# Patient Record
Sex: FEMALE | Race: WHITE | NOT HISPANIC OR LATINO | Employment: OTHER | URBAN - METROPOLITAN AREA
[De-identification: names, ages, dates, MRNs, and addresses within clinical notes are randomized per-mention and may not be internally consistent; named-entity substitution may affect disease eponyms.]

---

## 2021-05-12 ENCOUNTER — PATIENT MESSAGE (OUTPATIENT)
Dept: RESEARCH | Facility: HOSPITAL | Age: 51
End: 2021-05-12

## 2021-07-11 PROBLEM — I35.0 NONRHEUMATIC AORTIC VALVE STENOSIS: Status: ACTIVE | Noted: 2021-07-11

## 2021-08-11 PROBLEM — I35.0 NONRHEUMATIC AORTIC VALVE STENOSIS: Status: RESOLVED | Noted: 2021-07-11 | Resolved: 2021-08-11

## 2021-08-11 PROBLEM — D72.829 LEUCOCYTOSIS: Status: ACTIVE | Noted: 2021-08-11

## 2021-08-11 PROBLEM — Z95.2 S/P AVR (AORTIC VALVE REPLACEMENT): Status: ACTIVE | Noted: 2021-08-11

## 2021-08-12 PROBLEM — D69.6 THROMBOCYTOPENIA: Status: ACTIVE | Noted: 2021-08-12

## 2021-08-12 PROBLEM — D62 ACUTE BLOOD LOSS ANEMIA: Status: ACTIVE | Noted: 2021-08-12

## 2021-08-13 PROBLEM — E83.39 HYPOPHOSPHATEMIA: Status: ACTIVE | Noted: 2021-08-13

## 2021-10-22 PROBLEM — Z95.2 S/P AVR (AORTIC VALVE REPLACEMENT): Status: ACTIVE | Noted: 2021-10-22

## 2022-01-09 PROBLEM — E11.65 UNCONTROLLED TYPE 2 DIABETES MELLITUS WITH HYPERGLYCEMIA: Chronic | Status: ACTIVE | Noted: 2022-01-09

## 2022-01-09 PROBLEM — E11.65 UNCONTROLLED TYPE 2 DIABETES MELLITUS WITH HYPERGLYCEMIA: Status: ACTIVE | Noted: 2022-01-09

## 2022-02-08 PROBLEM — I10 PRIMARY HYPERTENSION: Status: RESOLVED | Noted: 2022-02-08 | Resolved: 2022-02-08

## 2022-02-08 PROBLEM — I10 PRIMARY HYPERTENSION: Status: ACTIVE | Noted: 2022-02-08

## 2022-02-10 PROBLEM — G56.92 NEUROPATHY OF LEFT UPPER EXTREMITY: Chronic | Status: ACTIVE | Noted: 2022-02-10

## 2022-02-10 PROBLEM — G56.92 NEUROPATHY OF LEFT UPPER EXTREMITY: Status: ACTIVE | Noted: 2022-02-10

## 2022-03-11 ENCOUNTER — PATIENT MESSAGE (OUTPATIENT)
Dept: ADMINISTRATIVE | Facility: OTHER | Age: 52
End: 2022-03-11

## 2022-03-22 ENCOUNTER — PATIENT MESSAGE (OUTPATIENT)
Dept: ADMINISTRATIVE | Facility: OTHER | Age: 52
End: 2022-03-22

## 2022-04-21 PROBLEM — I95.9 HYPOTENSION: Status: ACTIVE | Noted: 2022-04-21

## 2022-05-04 ENCOUNTER — PATIENT MESSAGE (OUTPATIENT)
Dept: OTHER | Facility: OTHER | Age: 52
End: 2022-05-04

## 2022-05-28 PROBLEM — I33.0 INFECTIVE ENDOCARDITIS: Status: ACTIVE | Noted: 2022-05-28

## 2022-05-30 PROBLEM — R51.9 HEADACHE: Status: ACTIVE | Noted: 2022-05-30

## 2022-05-30 PROBLEM — K21.9 GERD (GASTROESOPHAGEAL REFLUX DISEASE): Status: ACTIVE | Noted: 2022-05-30

## 2022-06-09 ENCOUNTER — PATIENT MESSAGE (OUTPATIENT)
Dept: OTHER | Facility: OTHER | Age: 52
End: 2022-06-09

## 2022-08-07 ENCOUNTER — PATIENT MESSAGE (OUTPATIENT)
Dept: ADMINISTRATIVE | Facility: OTHER | Age: 52
End: 2022-08-07

## 2022-09-27 ENCOUNTER — PATIENT MESSAGE (OUTPATIENT)
Dept: OTHER | Facility: OTHER | Age: 52
End: 2022-09-27

## 2022-09-30 ENCOUNTER — ANTI-COAG VISIT (OUTPATIENT)
Dept: CARDIOLOGY | Facility: CLINIC | Age: 52
End: 2022-09-30

## 2022-09-30 DIAGNOSIS — Z95.2 S/P AVR (AORTIC VALVE REPLACEMENT): Primary | ICD-10-CM

## 2022-10-03 PROBLEM — I95.9 HYPOTENSION: Status: RESOLVED | Noted: 2022-04-21 | Resolved: 2022-10-03

## 2022-10-03 PROBLEM — E83.39 HYPOPHOSPHATEMIA: Status: RESOLVED | Noted: 2021-08-13 | Resolved: 2022-10-03

## 2022-10-03 PROBLEM — R51.9 HEADACHE: Status: RESOLVED | Noted: 2022-05-30 | Resolved: 2022-10-03

## 2022-10-18 NOTE — PROGRESS NOTES
Encounter created in error and chart signed to close for administrative purposes only.  The listed referring Physician unwilling to sign chart so requested Dr Dejesus to sign.

## 2022-11-04 PROBLEM — H91.93 DECREASED HEARING OF BOTH EARS: Status: ACTIVE | Noted: 2022-11-04

## 2022-11-07 ENCOUNTER — PATIENT MESSAGE (OUTPATIENT)
Dept: ADMINISTRATIVE | Facility: OTHER | Age: 52
End: 2022-11-07

## 2022-12-04 ENCOUNTER — PATIENT MESSAGE (OUTPATIENT)
Dept: OTHER | Facility: OTHER | Age: 52
End: 2022-12-04

## 2022-12-05 ENCOUNTER — PATIENT MESSAGE (OUTPATIENT)
Dept: OTHER | Facility: OTHER | Age: 52
End: 2022-12-05

## 2023-02-17 ENCOUNTER — PATIENT MESSAGE (OUTPATIENT)
Dept: ADMINISTRATIVE | Facility: OTHER | Age: 53
End: 2023-02-17

## 2023-05-10 ENCOUNTER — PATIENT OUTREACH (OUTPATIENT)
Dept: ADMINISTRATIVE | Facility: HOSPITAL | Age: 53
End: 2023-05-10

## 2023-06-12 ENCOUNTER — PATIENT MESSAGE (OUTPATIENT)
Dept: ADMINISTRATIVE | Facility: OTHER | Age: 53
End: 2023-06-12

## 2023-08-04 PROBLEM — Z79.01 ANTICOAGULATION GOAL OF INR 2.5 TO 3.5: Status: ACTIVE | Noted: 2023-08-04

## 2023-08-04 PROBLEM — Z51.81 ANTICOAGULATION GOAL OF INR 2.5 TO 3.5: Status: ACTIVE | Noted: 2023-08-04

## 2023-08-16 ENCOUNTER — PATIENT MESSAGE (OUTPATIENT)
Dept: ADMINISTRATIVE | Facility: OTHER | Age: 53
End: 2023-08-16

## 2023-11-14 ENCOUNTER — PATIENT MESSAGE (OUTPATIENT)
Dept: ADMINISTRATIVE | Facility: HOSPITAL | Age: 53
End: 2023-11-14

## 2024-02-20 ENCOUNTER — PATIENT MESSAGE (OUTPATIENT)
Dept: ADMINISTRATIVE | Facility: HOSPITAL | Age: 54
End: 2024-02-20

## 2024-03-07 PROBLEM — T82.857A STENOSIS OF PROSTHETIC AORTIC VALVE: Status: ACTIVE | Noted: 2024-03-07

## 2024-03-25 ENCOUNTER — PATIENT MESSAGE (OUTPATIENT)
Dept: ADMINISTRATIVE | Facility: OTHER | Age: 54
End: 2024-03-25

## 2024-05-21 ENCOUNTER — PATIENT MESSAGE (OUTPATIENT)
Dept: ADMINISTRATIVE | Facility: HOSPITAL | Age: 54
End: 2024-05-21
Payer: MEDICAID

## 2024-05-24 ENCOUNTER — PATIENT OUTREACH (OUTPATIENT)
Dept: ADMINISTRATIVE | Facility: HOSPITAL | Age: 54
End: 2024-05-24
Payer: MEDICAID

## 2024-06-03 ENCOUNTER — TELEPHONE (OUTPATIENT)
Dept: CARDIOTHORACIC SURGERY | Facility: CLINIC | Age: 54
End: 2024-06-03
Payer: MEDICAID

## 2024-06-03 DIAGNOSIS — T82.857A STENOSIS OF PROSTHETIC AORTIC VALVE, INITIAL ENCOUNTER: Primary | ICD-10-CM

## 2024-06-03 DIAGNOSIS — I35.0 NONRHEUMATIC AORTIC VALVE STENOSIS: ICD-10-CM

## 2024-06-03 DIAGNOSIS — Z95.2 S/P AVR (AORTIC VALVE REPLACEMENT): ICD-10-CM

## 2024-06-03 NOTE — TELEPHONE ENCOUNTER
Called and scheduled pt for Redo AVR consult on 6/17.  Pt provided with appt date, times, and locations, which she verbalized understanding to.    ----- Message from Thu Hess sent at 6/3/2024  8:39 AM CDT -----  Regarding: High risk appopintment  Contact: Anitha/[facility] [place] [phone] or the patient at [phone]  Anitha calling from cardio thoracic surgery in [place] to schedule appointment due to T82.857D (ICD-10-CM) - Stenosis of prosthetic aortic valve, subsequent encounter per referral in [facility]. She stated the patient is high risk.  Please contact patient as soon as possible.

## 2024-06-04 ENCOUNTER — PATIENT OUTREACH (OUTPATIENT)
Dept: ADMINISTRATIVE | Facility: HOSPITAL | Age: 54
End: 2024-06-04
Payer: MEDICAID

## 2024-06-04 DIAGNOSIS — E11.65 UNCONTROLLED TYPE 2 DIABETES MELLITUS WITH HYPERGLYCEMIA: Primary | Chronic | ICD-10-CM

## 2024-06-06 ENCOUNTER — PATIENT OUTREACH (OUTPATIENT)
Dept: ADMINISTRATIVE | Facility: HOSPITAL | Age: 54
End: 2024-06-06
Payer: MEDICAID

## 2024-06-14 ENCOUNTER — PATIENT MESSAGE (OUTPATIENT)
Dept: CARDIOTHORACIC SURGERY | Facility: CLINIC | Age: 54
End: 2024-06-14
Payer: MEDICAID

## 2024-06-17 ENCOUNTER — OFFICE VISIT (OUTPATIENT)
Dept: CARDIOTHORACIC SURGERY | Facility: CLINIC | Age: 54
End: 2024-06-17
Payer: MEDICAID

## 2024-06-17 ENCOUNTER — HOSPITAL ENCOUNTER (OUTPATIENT)
Dept: CARDIOLOGY | Facility: HOSPITAL | Age: 54
Discharge: HOME | End: 2024-06-17
Attending: THORACIC SURGERY (CARDIOTHORACIC VASCULAR SURGERY)
Payer: MEDICAID

## 2024-06-17 VITALS
DIASTOLIC BLOOD PRESSURE: 59 MMHG | OXYGEN SATURATION: 97 % | WEIGHT: 223.31 LBS | BODY MASS INDEX: 38.12 KG/M2 | HEART RATE: 85 BPM | HEIGHT: 64 IN | SYSTOLIC BLOOD PRESSURE: 126 MMHG

## 2024-06-17 VITALS
HEART RATE: 65 BPM | WEIGHT: 229 LBS | SYSTOLIC BLOOD PRESSURE: 130 MMHG | DIASTOLIC BLOOD PRESSURE: 70 MMHG | BODY MASS INDEX: 39.09 KG/M2 | HEIGHT: 64 IN

## 2024-06-17 DIAGNOSIS — Z95.2 S/P AVR (AORTIC VALVE REPLACEMENT): Primary | ICD-10-CM

## 2024-06-17 DIAGNOSIS — T82.857D STENOSIS OF PROSTHETIC AORTIC VALVE, SUBSEQUENT ENCOUNTER: ICD-10-CM

## 2024-06-17 DIAGNOSIS — Z95.2 S/P AVR (AORTIC VALVE REPLACEMENT): ICD-10-CM

## 2024-06-17 DIAGNOSIS — T82.857A STENOSIS OF PROSTHETIC AORTIC VALVE, INITIAL ENCOUNTER: ICD-10-CM

## 2024-06-17 DIAGNOSIS — I35.0 NONRHEUMATIC AORTIC VALVE STENOSIS: ICD-10-CM

## 2024-06-17 LAB
ASCENDING AORTA: 3.17 CM
AV INDEX (PROSTH): 0.23
AV MEAN GRADIENT: 28 MMHG
AV PEAK GRADIENT: 52 MMHG
AV VALVE AREA BY VELOCITY RATIO: 0.51 CM²
AV VALVE AREA: 0.6 CM²
AV VELOCITY RATIO: 0.19
BSA FOR ECHO PROCEDURE: 2.17 M2
CV ECHO LV RWT: 0.46 CM
DOP CALC AO PEAK VEL: 3.6 M/S
DOP CALC AO VTI: 72 CM
DOP CALC LVOT AREA: 2.6 CM2
DOP CALC LVOT DIAMETER: 1.82 CM
DOP CALC LVOT PEAK VEL: 0.7 M/S
DOP CALC LVOT STROKE VOLUME: 43.4 CM3
DOP CALCLVOT PEAK VEL VTI: 16.69 CM
E WAVE DECELERATION TIME: 188.32 MSEC
E/A RATIO: 0.88
E/E' RATIO: 7.26 M/S
ECHO LV POSTERIOR WALL: 1.01 CM (ref 0.6–1.1)
FRACTIONAL SHORTENING: 24 % (ref 28–44)
INTERVENTRICULAR SEPTUM: 0.93 CM (ref 0.6–1.1)
LA MAJOR: 4.52 CM
LA MINOR: 3.77 CM
LA WIDTH: 3.78 CM
LEFT ATRIUM SIZE: 3.43 CM
LEFT ATRIUM VOLUME INDEX MOD: 24.8 ML/M2
LEFT ATRIUM VOLUME INDEX: 21.9 ML/M2
LEFT ATRIUM VOLUME MOD: 51.41 CM3
LEFT ATRIUM VOLUME: 45.31 CM3
LEFT INTERNAL DIMENSION IN SYSTOLE: 3.3 CM (ref 2.1–4)
LEFT VENTRICLE DIASTOLIC VOLUME INDEX: 41.6 ML/M2
LEFT VENTRICLE DIASTOLIC VOLUME: 86.12 ML
LEFT VENTRICLE MASS INDEX: 68 G/M2
LEFT VENTRICLE SYSTOLIC VOLUME INDEX: 21.3 ML/M2
LEFT VENTRICLE SYSTOLIC VOLUME: 44.15 ML
LEFT VENTRICULAR INTERNAL DIMENSION IN DIASTOLE: 4.37 CM (ref 3.5–6)
LEFT VENTRICULAR MASS: 140.2 G
LV LATERAL E/E' RATIO: 6.27 M/S
LV SEPTAL E/E' RATIO: 8.63 M/S
MV PEAK A VEL: 0.78 M/S
MV PEAK E VEL: 0.69 M/S
MV STENOSIS PRESSURE HALF TIME: 54.61 MS
MV VALVE AREA P 1/2 METHOD: 4.03 CM2
OHS CV RV/LV RATIO: 1.05 CM
RA MAJOR: 4.76 CM
RA PRESSURE ESTIMATED: 3 MMHG
RA WIDTH: 3.72 CM
RIGHT VENTRICULAR END-DIASTOLIC DIMENSION: 4.61 CM
SINUS: 2.91 CM
STJ: 2.4 CM
TDI LATERAL: 0.11 M/S
TDI SEPTAL: 0.08 M/S
TDI: 0.1 M/S
TRICUSPID ANNULAR PLANE SYSTOLIC EXCURSION: 1.23 CM
Z-SCORE OF LEFT VENTRICULAR DIMENSION IN END DIASTOLE: -3.65
Z-SCORE OF LEFT VENTRICULAR DIMENSION IN END SYSTOLE: -1.22

## 2024-06-17 PROCEDURE — 3078F DIAST BP <80 MM HG: CPT | Mod: CPTII,,, | Performed by: THORACIC SURGERY (CARDIOTHORACIC VASCULAR SURGERY)

## 2024-06-17 PROCEDURE — 3008F BODY MASS INDEX DOCD: CPT | Mod: CPTII,,, | Performed by: THORACIC SURGERY (CARDIOTHORACIC VASCULAR SURGERY)

## 2024-06-17 PROCEDURE — 99999 PR PBB SHADOW E&M-EST. PATIENT-LVL IV: CPT | Mod: PBBFAC,,, | Performed by: THORACIC SURGERY (CARDIOTHORACIC VASCULAR SURGERY)

## 2024-06-17 PROCEDURE — 93306 TTE W/DOPPLER COMPLETE: CPT | Mod: 26,,, | Performed by: INTERNAL MEDICINE

## 2024-06-17 PROCEDURE — 1160F RVW MEDS BY RX/DR IN RCRD: CPT | Mod: CPTII,,, | Performed by: THORACIC SURGERY (CARDIOTHORACIC VASCULAR SURGERY)

## 2024-06-17 PROCEDURE — 3044F HG A1C LEVEL LT 7.0%: CPT | Mod: CPTII,,, | Performed by: THORACIC SURGERY (CARDIOTHORACIC VASCULAR SURGERY)

## 2024-06-17 PROCEDURE — 25500020 PHARM REV CODE 255: Performed by: THORACIC SURGERY (CARDIOTHORACIC VASCULAR SURGERY)

## 2024-06-17 PROCEDURE — 4010F ACE/ARB THERAPY RXD/TAKEN: CPT | Mod: CPTII,,, | Performed by: THORACIC SURGERY (CARDIOTHORACIC VASCULAR SURGERY)

## 2024-06-17 PROCEDURE — 99205 OFFICE O/P NEW HI 60 MIN: CPT | Mod: S$PBB,,, | Performed by: THORACIC SURGERY (CARDIOTHORACIC VASCULAR SURGERY)

## 2024-06-17 PROCEDURE — 1159F MED LIST DOCD IN RCRD: CPT | Mod: CPTII,,, | Performed by: THORACIC SURGERY (CARDIOTHORACIC VASCULAR SURGERY)

## 2024-06-17 PROCEDURE — 3074F SYST BP LT 130 MM HG: CPT | Mod: CPTII,,, | Performed by: THORACIC SURGERY (CARDIOTHORACIC VASCULAR SURGERY)

## 2024-06-17 PROCEDURE — 99214 OFFICE O/P EST MOD 30 MIN: CPT | Mod: PBBFAC,25 | Performed by: THORACIC SURGERY (CARDIOTHORACIC VASCULAR SURGERY)

## 2024-06-17 PROCEDURE — C8929 TTE W OR WO FOL WCON,DOPPLER: HCPCS

## 2024-06-17 RX ADMIN — HUMAN ALBUMIN MICROSPHERES AND PERFLUTREN 0.11 MG: 10; .22 INJECTION, SOLUTION INTRAVENOUS at 09:06

## 2024-06-17 NOTE — PROGRESS NOTES
Subjective:      Patient ID: Shahbaz Cardenas is a 54 y.o. female.    Chief Complaint: No chief complaint on file.      HPI:  Shahbaz Cardenas is a 54 y.o. female who presents to an initial surgical evaluation for bioprosthetic Ao valve stenosis. Medical conditions includes evere aortic stenosis s/p SAVR with St Tunde #21 mm bioprosthetic valve in 8/2021 by Dr. Moore, infective endocarditis in 6/2022, aortic valve thrombus, on chronic warfarin therapy since 10/2021, DELTA (on CPAP), type 2 diabetes ( hg A1c 6.1), HTN, dyslipidemia, morbid obesity with BMI of 39.  Patient underwent left heart catheterization and right heart catheterization that showed angiographically normal coronary arteries and severe bioprosthetic aortic stenosis by gradient and KEITH ( mean gradient of 42mmHg and estimated KEITH of 0.78 cm2).  Patient has been following up with [facility]  for possible aortic valve replacement in the setting of severe patient prosthesis mismatch. She is here for 2nd opinion.     Patient reports for the  past few months, she has been more active, attempting to lose weight and eat healthy patient stated that she has lost significant weight. She independently performs ADL without issues. She reports worsening espinoza in the last year. Initially attribute it to over exertion. In June, she started mowing the lawn when she endorsed some lightheadedness and ESPINOZA. Also reports presyncopal episode 3 weeks ago when gardening. She did not pass out. Does have ongoing palpitations. She denies any chest pain, LE edema.       Pertinent PMH   After her AVR in 08/2021 was found to have severe prosthetic valve stenosis per ANUSHA in 11/2021 with findings concerning for thrombus/early pannus and was started on OAC. ANUSHA 5/2022 after about 2 months on therapeutic OAC demonstrated persistent severe stenosis w/ continued presence of thickening/echodensities in the aortic cusps and severe PPM. Blood cultures grew S. epidermidis and S.  trishas in 1/2 bottles, clinically asymptomatic s/p 6 wks of abx.      Cardiac CT after completion of abx reported a rounded appearing hypodense filling defect in the aortic side concerning for thrombus in 8/2022. INR goal was subsequently increased to 3-3.5. Repeat Cardiac CT in 12/2022 reported that the thrombus had resolved. Was seen by hematology 01/2023 per pt request as she was requiring high doses of Warfarin to keep her INR therapeutic. They recommended anticoagulation for 6 mo. following previous positive imaging studies and to repeat ANUSHA and CT before taking off anticoagulation.      F/u ANUSHA in 04/2023 demonstrated severe PPM w/ thickened struts but no mass. After risks v. benefit discussion she decided to remain on OAC regardless. She was then evaluated by [facility] (Dr. Moore) in 12/2022 for whether it was a thrombus or pannus - the findings were stable in size and since she was asymptomatic, they recommended to continue OAC and CTM for symptoms. She got a second opinion at  - and they suggested that she would benefit from an aortic root enlargement to allow for larger prosthetic valve implantation.    She was also evaluated in the valve clinic by Dr. Pulliam who agreed w/ above recommendation of continuing to monitor. Our recommendation was to pursue further evaluation at a high volume center w/ experience in complex procedures. However, she would like to defer it for now. She was recommended by outside cardiology to lose weight and reassess her severe PPM.     She is tentatively scheduled for an aortic root enlargement/replacement at  in the near future.      Family and social history reviewed    Review of patient's allergies indicates:   Allergen Reactions    Rifampin Hives    Gentamicin      Past Medical History:   Diagnosis Date    Back pain     Diabetes mellitus     Diabetes mellitus, type 2     GERD (gastroesophageal reflux disease)     Hyperlipidemia     Hypertension     mitral valve     Myocardial  infarction     Polycystic ovarian syndrome     Right knee injury     Sleep apnea     Thrombosis      Past Surgical History:   Procedure Laterality Date    ANGIOGRAM, CORONARY, WITH LEFT HEART CATHETERIZATION N/A 7/6/2021    Procedure: ANGIOGRAM,CORONARY,WITH LEFT HEART CATHETERIZATION;  Surgeon: Cleveland Polanco MD;  Location: [facility] CATH LAB;  Service: Cardiology;  Laterality: N/A;    AORTIC VALVE REPLACEMENT N/A 8/10/2021    Procedure: Replacement-valve-aortic  with bioprostethic tissue valve.;  Surgeon: Abhi Moore MD;  Location: [facility] MAIN OR;  Service: Cardiothoracic;  Laterality: N/A;    CATHETERIZATION OF BOTH LEFT AND RIGHT HEART N/A 5/23/2024    Procedure: CATHETERIZATION, HEART, BOTH LEFT AND RIGHT;  Surgeon: Rasheed Pulliam MD;  Location: [facility] CATH LAB;  Service: Cardiology;  Laterality: N/A;    CHOLECYSTECTOMY      CLOSURE  5/23/2024    Procedure: CLOSURE;  Surgeon: Rasheed Pulliam MD;  Location: [facility] CATH LAB;  Service: Cardiology;;    ESTABLISHMENT, VASCULAR ACCESS, WITH US GUIDANCE  5/23/2024    Procedure: ESTABLISHMENT, VASCULAR ACCESS, WITH US GUIDANCE;  Surgeon: Rasheed Pulliam MD;  Location: [facility] CATH LAB;  Service: Cardiology;;    TRANSESOPHAGEAL ECHOCARDIOGRAPHY N/A 6/16/2021    Procedure: ECHOCARDIOGRAM, TRANSESOPHAGEAL;  Surgeon: Jessica Panchal MD;  Location: [facility] CATH LAB;  Service: Cardiology;  Laterality: N/A;    TRANSESOPHAGEAL ECHOCARDIOGRAPHY N/A 11/3/2021    Procedure: ECHOCARDIOGRAM, TRANSESOPHAGEAL;  Surgeon: Jessica Panchal MD;  Location: [facility] CATH LAB;  Service: Cardiology;  Laterality: N/A;    TRANSESOPHAGEAL ECHOCARDIOGRAPHY N/A 5/27/2022    Procedure: ECHOCARDIOGRAM, TRANSESOPHAGEAL;  Surgeon: Jessica Panchal MD;  Location: [facility] CATH LAB;  Service: Cardiology;  Laterality: N/A;    TRANSESOPHAGEAL ECHOCARDIOGRAPHY N/A 10/25/2022    Procedure: ECHOCARDIOGRAM, TRANSESOPHAGEAL;  Surgeon: Jessica Panchal MD;  Location: [facility] CATH LAB;  Service: Cardiology;  Laterality: N/A;     TRANSESOPHAGEAL ECHOCARDIOGRAPHY N/A 4/26/2023    Procedure: ECHOCARDIOGRAM, TRANSESOPHAGEAL;  Surgeon: Jessica Panchal MD;  Location: [facility] CATH LAB;  Service: Cardiology;  Laterality: N/A;    TRANSESOPHAGEAL ECHOCARDIOGRAPHY N/A 5/2/2024    Procedure: ECHOCARDIOGRAM, TRANSESOPHAGEAL;  Surgeon: Argenis Lopez MD;  Location: [facility] CATH LAB;  Service: Cardiology;  Laterality: N/A;    WISDOM TOOTH EXTRACTION       Family History       Problem Relation (Age of Onset)    Arrhythmia Mother, Father    Heart attack Brother    Heart disease Mother, Father    Heart failure Mother, Father    Hyperlipidemia Mother, Father    Stroke Mother          Social History     Socioeconomic History    Marital status:    Tobacco Use    Smoking status: Never    Smokeless tobacco: Never   Substance and Sexual Activity    Alcohol use: Never    Drug use: Never     Social Determinants of Health     Financial Resource Strain: Low Risk  (11/27/2023)    Overall Financial Resource Strain (CARDIA)     Difficulty of Paying Living Expenses: Not very hard   Food Insecurity: Patient Declined (11/27/2023)    Hunger Vital Sign     Worried About Running Out of Food in the Last Year: Patient declined     Ran Out of Food in the Last Year: Patient declined   Transportation Needs: No Transportation Needs (11/27/2023)    PRAPARE - Transportation     Lack of Transportation (Medical): No     Lack of Transportation (Non-Medical): No   Physical Activity: Unknown (11/27/2023)    Exercise Vital Sign     Days of Exercise per Week: 4 days   Stress: Stress Concern Present (11/27/2023)    [employer] [place] [employer] - Occupational Stress Questionnaire     Feeling of Stress : To some extent   Housing Stability: Unknown (11/27/2023)    Housing Stability Vital Sign     Unable to Pay for Housing in the Last Year: No     Unstable Housing in the Last Year: Patient refused       Current medications Reviewed  Current Outpatient Medications on File Prior  to Visit   Medication Sig Dispense Refill    acetaminophen (TYLENOL) 500 MG tablet Take 1,000 mg by mouth 3 (three) times a week.      aspirin (ECOTRIN) 81 MG EC tablet Take 81 mg by mouth every evening.      calcium carbonate (OS-ANDREZ) 500 mg calcium (1,250 mg) chewable tablet Take 1 tablet by mouth daily as needed for Heartburn.      diclofenac sodium (SOLARAZE) 3 % gel Apply topically 2 (two) times daily.      diphenhydramine HCl (BENADRYL ALLERGY ORAL) Take 1 tablet by mouth as needed (insomnia). Patient reports taking 2x 25 mg tablets at 7-8 pm and 1.5 tablets in the middle of the night.      docusate sodium (COLACE) 100 MG capsule Take 300 mg by mouth every evening.      gabapentin (NEURONTIN) 300 MG capsule Take 3 capsules (900 mg total) by mouth 2 (two) times daily. 180 capsule 2    JARDIANCE 25 mg tablet Take 1 tablet (25 mg total) by mouth once daily. 90 tablet 0    LIDOcaine (LIDODERM) 5 % Place 1 patch onto the skin every 24 hours. Remove & Discard patch within 12 hours or as directed by MD      lisinopriL (PRINIVIL,ZESTRIL) 2.5 MG tablet Take 1 tablet by mouth once daily 90 tablet 2    metFORMIN (GLUCOPHAGE) 1000 MG tablet Take 1 tablet (1,000 mg total) by mouth 2 (two) times daily with meals. 180 tablet 3    multivitamin (ONE DAILY MULTIVITAMIN) per tablet Take 1 tablet by mouth once daily.      omeprazole (PRILOSEC) 20 MG capsule Take 1 capsule (20 mg total) by mouth once daily. (Patient taking differently: Take 20 mg by mouth once daily. Every other day) 90 capsule 3    rosuvastatin (CRESTOR) 40 MG Tab Take 1 tablet (40 mg total) by mouth every evening. 90 tablet 3    tirzepatide 15 mg/0.5 mL PnIj Inject 15 mg into the skin every 7 days. 2 mL 5    tiZANidine 4 mg Cap Take 4 mg by mouth once daily.      warfarin (COUMADIN) 7.5 MG tablet Take 0.5 tablet (3.75 mg) by mouth on Monday and 1 tablet (7.5 mg) all other days. 90 tablet 0     No current facility-administered medications on file prior to visit.        Review of Systems   Constitutional:  Negative for activity change, appetite change, fatigue and fever.   HENT:  Negative for nosebleeds.    Respiratory:  Positive for shortness of breath. Negative for cough.    Cardiovascular:  Positive for palpitations. Negative for chest pain and leg swelling.   Gastrointestinal:  Negative for abdominal distention, abdominal pain and nausea.   Genitourinary:  Negative for frequency.   Musculoskeletal:  Negative for arthralgias and myalgias.   Skin:  Negative for rash.   Neurological:  Negative for dizziness and numbness.   Hematological:  Does not bruise/bleed easily.     Objective:   Physical Exam  Constitutional:       Appearance: Normal appearance. She is obese.   HENT:      Head: Normocephalic and atraumatic.   Eyes:      Extraocular Movements: Extraocular movements intact.   Cardiovascular:      Rate and Rhythm: Normal rate.      Heart sounds: Murmur heard.   Pulmonary:      Effort: Pulmonary effort is normal.   Abdominal:      General: Abdomen is flat.   Musculoskeletal:         General: Normal range of motion.      Cervical back: Normal range of motion.   Skin:     General: Skin is warm and dry.      Capillary Refill: Capillary refill takes less than 2 seconds.      Coloration: Skin is not pale.   Neurological:      General: No focal deficit present.      Mental Status: She is alert.         Diagnostic Results: reviewed   TTE 6/17/24    Left/Right Angiogram 5/23/24    Angiographically normal coronary arteries.    Severe bioprosthetic aortic stenosis with mean gradient of 42mmHg and estimated KEITH of 0.78 cm2.    Normal right sided filling pressures, mildly elevated left sided filling pressures, and normal cardiac output.    ANUSHA 5/2/24    Left Ventricle: Normal wall motion. There is normal systolic function.    Right Ventricle: Normal right ventricular cavity size. Systolic function is normal.    Left Atrium: The left atrial appendage appears normal. Appendage velocity  is normal at greater than 40 cm/sec. There is no thrombus in the left atrial appendage.    Aortic Valve: There is a bioprosthetic valve in the aortic position that is well-seated. It is reported to be a 21 mm St. Tunde valve. Leaflets appear to be opening well. Average aortic valve peak velocity is 4 m/s w/ early peaking jet contour, AT is 78 ms, AT/ET is 0.3, average mean gradient is 27 mmHg, DVI is 0.35, EOA by cont. eq. is 0.96 cm², and iEOA is 0.48 cm2/m2 suggestive of severe patient-prosthesis mismatch. There is no significant regurgitation. Trace paravalvular regurgitation.     ANUSHA 5/2023  ANUSHA was performed to assess prosthetic aortic valve.  There is a 21 mm St Tunde bioprosthetic valve present. The valve is well-seated in a stable position. Valve struts are thickened. Valve leaflet mobility appears normal, though the leaflet along the LM is not well visualized.  No regurgitation or paravalvular leak seen.  Peak velocity is 3.6 m/s, mean gradient 31mmHg, DVI 0.33, AT 84 ms. Aortic valve area 0.7 cm2 by continuity equation, iAVA 0.3 cm2.  Findings consistent with severe patient prosthesis mismatch.  The left ventricle is normal in size with normal systolic function. The estimated ejection fraction is 65-70%.    ANUSHA 10/2022  ANUSHA was performed to assess prosthetic aortic valve.  There is a 21 mm St. Tunde bioprosthetic aortic valve present. The valve is well-seated in stable postion. The leaflets are not well visualized. Valve struts are thickened. Right strut is significantly thicker, prior echodensity visualized in the aortic side is still present. Differential includes panus, vegetation. Cardiac CT was concerning for thrombus.  Peak velocity 3.7 m/s, mean gradient 31 mm Hg,  ms, and DVI 0.3. KEITH 0.7cm2 by continuity eq. iAVA 0.3 cm2  Doppler parameters are consistent with severe patient prosthetic missmatch. However, given echo findings some degree of stenosis is possible. Of note, there is some  improvement in velocities, mean gradient and DVI  No intra or paravalvular regurgitation.  The left ventricle is normal in size with hyperdynamic systolic function.  The estimated ejection fraction is 75%.     ANUSHA 5/2022  ANUSHA was performed to assess bioprosthetic aortic valve.  There is a 21 mm St Tunde bioprosthetic aortic valve present. There is continued presence of layered echodensities on aortic sides of cusps. There is no prosthetic or perivalvular regurgitation. The valve is well seated and in stable position. Leaflet motion is not well visualized. Left coronary cusp is thicker.  Mobile echodensity at 12' o'clock postion probably represents a thick strut. Prior echodensity at 4-5' position (0.5 x 0.6 cm) has different appearance, more echogenic compared to prior study, resolved thrombus?. Layered echodensity along the right strut at 18'-19 o'clock position appears somewhat larger in SAX views on current study (1.3 cm x 0.8 cm). Differential includes vegetation, pannus, thrombus.  There is severe aortic valve stenosis. Peak velocity of 4.00m/s and mean gradient of 39 mmHg, DVI 2.3, AT 90 msec.  The left ventricle is normal in size with hyperdynamic systolic function. The estimated ejection fraction is > 75%  Recommend cardiac CT to further evaluate aortic valve findings.  Patient is currently completely asymptomatic with excellent functional capacity. Blood cultures, CRP, sed rate, procalcitotin ordered and obtained before discharged after ANUSHA  \  ANUSHA 11/2021  ANUSHA was performed for assessment of the prosthetic aortic valve.  There is a 21 mm St. Tunde bioprosthetic aortic valve present. The prosthetic valve is abnormal. There is thickening of the base of the leaflets more pronounced in the left and anterior cusps. There are layered echodensities in the aortic side of the cusps highly concerning for thrombus. Echodensity in the left cusp is about 0.8 x 0.6 mm in SAX views. Leaflets are not well visualized.  There  is severe aortic valve stenosis. peak velocity is 4.17 m/s; mean gradient is 38 mmHg.  The left ventricle is normal in size with normal systolic function. The estimated ejection fraction is 65-70%.  Normal right ventricular size with normal right ventricular systolic function.  There is no pericardial effusion.     Assessment:   Prosthetic Aortic valve  stenosis   Hx of AVR 8/2021  Plan:   All pertinent labs and images reviewed.  Patient has a recent echo that is suggestive of moderate aortic stenosis in terms of gradient but has a dimensions less index of 0.23 which is suggestive of severe AS.  Currently patient is not significantly symptomatic.  Patient is losing weight and continues to have an elevated BMI 38.  Her previous operation was significantly complicated surgically.  As of now I would recommend patient to be followed up conservatively.  Patient has been turned down for surgery at other centers.  However, I would recommend patient to be evaluated by TAVR team.  I have talked to Dr. Dent who will see the patient in 2 months with a repeat ECHO to assess candidacy for TAVR.  All questions and concerns were addressed to patient's satisfaction.

## 2024-06-17 NOTE — NURSING
In for TTE with Optison. Test explained, Understanding verbalized. 22 g IV inserted to Left hand without difficulty. Optison given per protocol by Berryy and IV removed with pressure applied. Left ambulatory with .

## 2024-06-20 ENCOUNTER — TELEPHONE (OUTPATIENT)
Dept: CARDIOTHORACIC SURGERY | Facility: CLINIC | Age: 54
End: 2024-06-20
Payer: MEDICAID

## 2024-06-20 DIAGNOSIS — Z95.2 S/P AVR (AORTIC VALVE REPLACEMENT): Primary | ICD-10-CM

## 2024-06-20 DIAGNOSIS — T82.857A STENOSIS OF PROSTHETIC AORTIC VALVE, INITIAL ENCOUNTER: ICD-10-CM

## 2024-06-20 NOTE — TELEPHONE ENCOUNTER
Called and notified pt that Dr. Leon discussed her case with Dr. Dent in Interventional Cardiology, who would like to see her in clinic in August or shortly thereafter, which she verbalized understanding and appreciation for.  Pt informed that Dr. Dent' office will contact her about scheduling an appt, which she verbalized understanding to.  Inbasket message sent to Dr. Dent' office regarding scheduling.

## 2024-07-03 ENCOUNTER — PATIENT MESSAGE (OUTPATIENT)
Dept: CARDIOTHORACIC SURGERY | Facility: CLINIC | Age: 54
End: 2024-07-03
Payer: MEDICAID

## 2024-07-03 ENCOUNTER — PATIENT MESSAGE (OUTPATIENT)
Dept: CARDIOLOGY | Facility: CLINIC | Age: 54
End: 2024-07-03
Payer: MEDICAID

## 2024-07-03 ENCOUNTER — TELEPHONE (OUTPATIENT)
Dept: CARDIOTHORACIC SURGERY | Facility: CLINIC | Age: 54
End: 2024-07-03
Payer: MEDICAID

## 2024-07-03 NOTE — TELEPHONE ENCOUNTER
"Returned call to pt who requested that Dr. Dent' nurse from Interventional Cardiology call her back regarding scheduling.  Striped Sail message sent to Dr. Dent' nurse informing her of pt's request.    ----- Message from Oma Harrington sent at 7/3/2024 10:26 AM CDT -----  Regarding: Consult/Advisory          Name Of Caller: Self     Contact Preference?:   [phone]      Provider Name:   Carolyn     Does patient feel the need to be seen today? No     What is the nature of the call?:    Pt has some questions regarding the cardiology surgeon Dr. Leon referred her to. Please call back to assist.        Additional Notes:  "Thank you for all that you do for our patients  "

## 2024-07-08 ENCOUNTER — PATIENT MESSAGE (OUTPATIENT)
Dept: OTHER | Facility: OTHER | Age: 54
End: 2024-07-08
Payer: MEDICAID

## 2024-07-17 ENCOUNTER — PATIENT MESSAGE (OUTPATIENT)
Dept: OTHER | Facility: OTHER | Age: 54
End: 2024-07-17
Payer: MEDICAID

## 2024-08-14 ENCOUNTER — PATIENT MESSAGE (OUTPATIENT)
Dept: ADMINISTRATIVE | Facility: OTHER | Age: 54
End: 2024-08-14
Payer: MEDICAID

## 2024-11-18 ENCOUNTER — PATIENT MESSAGE (OUTPATIENT)
Dept: OTHER | Facility: OTHER | Age: 54
End: 2024-11-18
Payer: MEDICAID

## 2024-12-05 PROBLEM — Z12.4 SCREENING FOR CERVICAL CANCER: Status: ACTIVE | Noted: 2024-12-05

## 2024-12-05 PROBLEM — E11.42 TYPE 2 DIABETES MELLITUS WITH DIABETIC POLYNEUROPATHY, WITHOUT LONG-TERM CURRENT USE OF INSULIN: Status: ACTIVE | Noted: 2024-12-05

## 2025-02-14 PROBLEM — R21 RASH: Status: ACTIVE | Noted: 2025-02-14

## 2025-02-19 ENCOUNTER — PATIENT MESSAGE (OUTPATIENT)
Dept: ADMINISTRATIVE | Facility: OTHER | Age: 55
End: 2025-02-19
Payer: MEDICAID

## 2025-03-12 ENCOUNTER — PATIENT OUTREACH (OUTPATIENT)
Dept: ADMINISTRATIVE | Facility: HOSPITAL | Age: 55
End: 2025-03-12
Payer: MEDICAID

## 2025-03-12 DIAGNOSIS — E11.65 UNCONTROLLED TYPE 2 DIABETES MELLITUS WITH HYPERGLYCEMIA: Primary | ICD-10-CM

## 2025-03-12 PROBLEM — M25.512 CHRONIC LEFT SHOULDER PAIN: Status: ACTIVE | Noted: 2025-03-12

## 2025-03-12 PROBLEM — G89.29 CHRONIC LEFT SHOULDER PAIN: Status: ACTIVE | Noted: 2025-03-12

## 2025-03-19 ENCOUNTER — PATIENT OUTREACH (OUTPATIENT)
Dept: ADMINISTRATIVE | Facility: HOSPITAL | Age: 55
End: 2025-03-19
Payer: MEDICAID

## 2025-04-21 PROBLEM — Z76.89 ESTABLISHING CARE WITH NEW DOCTOR, ENCOUNTER FOR: Status: ACTIVE | Noted: 2025-04-21

## 2025-04-21 PROBLEM — E04.2 MULTIPLE THYROID NODULES: Status: ACTIVE | Noted: 2025-04-21

## 2025-05-01 ENCOUNTER — PATIENT MESSAGE (OUTPATIENT)
Dept: ADMINISTRATIVE | Facility: OTHER | Age: 55
End: 2025-05-01
Payer: MEDICAID

## 2025-05-28 ENCOUNTER — PATIENT OUTREACH (OUTPATIENT)
Dept: ADMINISTRATIVE | Facility: HOSPITAL | Age: 55
End: 2025-05-28
Payer: MEDICAID

## 2025-05-29 PROBLEM — Z76.89 ESTABLISHING CARE WITH NEW DOCTOR, ENCOUNTER FOR: Status: RESOLVED | Noted: 2025-04-21 | Resolved: 2025-05-29

## 2025-06-11 ENCOUNTER — PATIENT OUTREACH (OUTPATIENT)
Dept: ADMINISTRATIVE | Facility: HOSPITAL | Age: 55
End: 2025-06-11
Payer: MEDICAID

## 2025-07-13 ENCOUNTER — PATIENT MESSAGE (OUTPATIENT)
Dept: ADMINISTRATIVE | Facility: OTHER | Age: 55
End: 2025-07-13
Payer: MEDICAID